# Patient Record
Sex: FEMALE | Race: WHITE | NOT HISPANIC OR LATINO | URBAN - METROPOLITAN AREA
[De-identification: names, ages, dates, MRNs, and addresses within clinical notes are randomized per-mention and may not be internally consistent; named-entity substitution may affect disease eponyms.]

---

## 2024-05-30 ENCOUNTER — OFFICE (OUTPATIENT)
Dept: URBAN - METROPOLITAN AREA CLINIC 67 | Facility: CLINIC | Age: 24
End: 2024-05-30

## 2024-05-30 VITALS
WEIGHT: 293 LBS | HEIGHT: 65 IN | SYSTOLIC BLOOD PRESSURE: 122 MMHG | DIASTOLIC BLOOD PRESSURE: 82 MMHG | OXYGEN SATURATION: 98 % | HEART RATE: 110 BPM

## 2024-05-30 DIAGNOSIS — R10.13 EPIGASTRIC PAIN: ICD-10-CM

## 2024-05-30 DIAGNOSIS — K76.0 FATTY (CHANGE OF) LIVER, NOT ELSEWHERE CLASSIFIED: ICD-10-CM

## 2024-05-30 DIAGNOSIS — R11.2 NAUSEA WITH VOMITING, UNSPECIFIED: ICD-10-CM

## 2024-05-30 DIAGNOSIS — R19.4 CHANGE IN BOWEL HABIT: ICD-10-CM

## 2024-05-30 PROCEDURE — 99204 OFFICE O/P NEW MOD 45 MIN: CPT | Performed by: INTERNAL MEDICINE

## 2024-05-30 RX ORDER — PANTOPRAZOLE SODIUM 20 MG/1
TABLET, DELAYED RELEASE ORAL
Qty: 30 | Refills: 0 | Status: ACTIVE
Start: 2024-05-30

## 2024-05-30 NOTE — SERVICEHPINOTES
Ese Kowalski   is seen for an initial visit today.
br
satya She reports that she was started on Wegovy earlier this year and shortly after her first dose escalation, she developed GI side effects that were mainly manifested by more loose stool/diarrhea and postprandial nausea/vomiting. 
brHer medication was stopped about 1 month ago and her symptoms have improved ("about 75% better") but have not resolved completely. She continues to experience a lot of postprandial nausea/dyspepsia especially with gluten-rich foods. She denies any GI tract bleeding symptoms and per her report, a recent abdominal ultrasound was remarkable for fatty liver (that report is forthcoming).

## 2024-05-30 NOTE — SERVICENOTES
We discussed that I suspect her GI symptoms are due to the Wegovy medication and hopefully they will continue to improve with time.
However, I will check a tTG IgA Ab titer to screen for celiac disease as well as a H.pylori stool antigen. In the meantime, I will put her on a 4 week course of low dose Pantoprazole to see if the helps with her dyspepsia/nausea. 
I will also check a BENDER FibroSure given the fatty liver seen on her recent ultrasound.